# Patient Record
Sex: MALE | Race: WHITE | NOT HISPANIC OR LATINO | Employment: FULL TIME | URBAN - METROPOLITAN AREA
[De-identification: names, ages, dates, MRNs, and addresses within clinical notes are randomized per-mention and may not be internally consistent; named-entity substitution may affect disease eponyms.]

---

## 2021-06-02 ENCOUNTER — RECORDS - HEALTHEAST (OUTPATIENT)
Dept: ADMINISTRATIVE | Facility: CLINIC | Age: 61
End: 2021-06-02

## 2021-12-09 ENCOUNTER — TRANSFERRED RECORDS (OUTPATIENT)
Dept: HEALTH INFORMATION MANAGEMENT | Facility: CLINIC | Age: 61
End: 2021-12-09

## 2021-12-09 ENCOUNTER — LAB REQUISITION (OUTPATIENT)
Dept: LAB | Facility: CLINIC | Age: 61
End: 2021-12-09

## 2021-12-09 DIAGNOSIS — E78.2 MIXED HYPERLIPIDEMIA: ICD-10-CM

## 2021-12-09 LAB
CHOLEST SERPL-MCNC: 136 MG/DL
FASTING STATUS PATIENT QL REPORTED: ABNORMAL
HDLC SERPL-MCNC: 37 MG/DL
LDLC SERPL CALC-MCNC: 80 MG/DL
TRIGL SERPL-MCNC: 97 MG/DL

## 2021-12-09 PROCEDURE — 80061 LIPID PANEL: CPT | Performed by: FAMILY MEDICINE

## 2021-12-12 ENCOUNTER — HEALTH MAINTENANCE LETTER (OUTPATIENT)
Age: 61
End: 2021-12-12

## 2022-02-17 ENCOUNTER — OFFICE VISIT (OUTPATIENT)
Dept: CARDIOLOGY | Facility: CLINIC | Age: 62
End: 2022-02-17
Payer: COMMERCIAL

## 2022-02-17 VITALS
WEIGHT: 235 LBS | SYSTOLIC BLOOD PRESSURE: 134 MMHG | HEART RATE: 62 BPM | RESPIRATION RATE: 18 BRPM | OXYGEN SATURATION: 99 % | DIASTOLIC BLOOD PRESSURE: 72 MMHG

## 2022-02-17 DIAGNOSIS — I25.10 CORONARY ARTERY DISEASE INVOLVING NATIVE CORONARY ARTERY OF NATIVE HEART WITHOUT ANGINA PECTORIS: Primary | ICD-10-CM

## 2022-02-17 PROCEDURE — 99204 OFFICE O/P NEW MOD 45 MIN: CPT | Performed by: INTERNAL MEDICINE

## 2022-02-17 RX ORDER — ATORVASTATIN CALCIUM 80 MG/1
80 TABLET, FILM COATED ORAL DAILY
Qty: 90 TABLET | Refills: 3 | Status: SHIPPED | OUTPATIENT
Start: 2022-02-17 | End: 2022-12-13

## 2022-02-17 RX ORDER — NITROGLYCERIN 0.4 MG/1
0.4 TABLET SUBLINGUAL EVERY 5 MIN PRN
Qty: 20 TABLET | Refills: 4 | Status: SHIPPED | OUTPATIENT
Start: 2022-02-17

## 2022-02-17 RX ORDER — NITROGLYCERIN 0.4 MG/1
0.4 TABLET SUBLINGUAL PRN
COMMUNITY
Start: 2020-07-08 | End: 2022-02-17

## 2022-02-17 RX ORDER — ATORVASTATIN CALCIUM 40 MG/1
40 TABLET, FILM COATED ORAL DAILY
COMMUNITY
Start: 2021-11-09 | End: 2022-02-17

## 2022-02-17 NOTE — LETTER
2/17/2022    Omega Morales MD  [place] [address]  [place] [address]    RE: Tunde Orozco       Dear Colleague,     I had the pleasure of seeing Tunde Orozco in the [facility].      [facility]  [phone]          Assessment/Recommendations   Patient with known coronary artery disease, with percutaneous intervention which was staged in 2019.  He had stents in his LAD and right coronary artery.  He has been feeling well with no recurrence of the chest pressure discomfort that he had prior to his stent placement.  He is not exercising on a regular basis and I have encouraged him to briskly walk or do other types of aerobic exercise for a minimum of 30 minutes and at least 5 times each week for a total of 150 minutes.  He will take that under advisement.  I commended him for taking the stairs to being active by walking to work.    His LDL cholesterol is recently measured at 80 and I would prefer it be under 70.  To that end we have increase his our atorvastatin to 80 mg a day we will send in a lab to be checked in 3 months but also can be checked at his primary care office as well.    He is on an antiplatelet agent, and his blood pressure is at goal.    We will see him back in 1 year, but of course would be happy to see him sooner if questions or problems arise.       History of Present Illness/Subjective    Mr. Tunde Orozco is a 61 year old male with known coronary artery disease, status post staged percutaneous interventions in 2019.  He had what sounded like unstable angina and had percutaneous intervention of the LAD followed a few weeks later by intervention in the right coronary artery.  He has never had return of the pressure sensation in his chest.  Once in a while when he is stressed out at work he will feel slight discomfort in his chest but it is different than the discomfort he had prior to his stents and is not brought on with  physical activity.  He does walk to work which is 2 blocks and climb stairs at work often.  He goes up 4 flights of stairs and is pumping at the top but does not get any chest discomfort.  He denies orthopnea, paroxysmal nocturnal dyspnea, peripheral edema, syncope or near syncopal episodes.  He has no history of rheumatic fever, cerebrovascular accident or TIA.    He has never been treated for hypertension, has never smoked cigarettes, and is not diabetic.  His brother  of a myocardial infarction at age 56 and his father had an abdominal aneurysm.  The patient's recent LDL cholesterol on 40 mg of our atorvastatin was measured at 80.         Physical Examination Review of Systems   /72 (BP Location: Left arm, Patient Position: Sitting, Cuff Size: Adult Large)   Pulse 62   Resp 18   Wt 106.6 kg (235 lb)   SpO2 99%   There is no height or weight on file to calculate BMI.  Wt Readings from Last 3 Encounters:   22 106.6 kg (235 lb)     General Appearance:   Alert, cooperative and in no acute distress.   ENT/Mouth: Patient wearing a mask.      EYES:  no scleral icterus, normal conjunctivae   Neck: JVP normal. No Hepatojugular reflux. Thyroid not visualized.   Chest/Lungs:   Lungs are clear to auscultation, equal chest wall expansion.   Cardiovascular:   S1, S2 without murmur ,clicks or rubs. Brachial, radial and posterior tibial pulses are intact and symetric. No carotid bruits noted   Abdomen:  Nontender. BS+.    Extremities: No cyanosis, clubbing or edema   Skin: no xanthelasma, warm.    Neurologic: normal arm movement bilateral, no tremors     Psychiatric: Appropriate affect.      Enc Vitals  BP: 134/72  Pulse: 62  Resp: 18  SpO2: 99 %  Weight: 106.6 kg (235 lb)                                           Medical History  Surgical History Family History Social History   No past medical history on file. No past surgical history on file. No family history on file. Social History     Socioeconomic  History     Marital status:      Spouse name: Not on file     Number of children: Not on file     Years of education: Not on file     Highest education level: Not on file   Occupational History     Not on file   Tobacco Use     Smoking status: Not on file     Smokeless tobacco: Not on file   Substance and Sexual Activity     Alcohol use: Not on file     Drug use: Not on file     Sexual activity: Not on file   Other Topics Concern     Not on file   Social History Narrative     Not on file     Social Determinants of Health     Financial Resource Strain: Not on file   Food Insecurity: Not on file   Transportation Needs: Not on file   Physical Activity: Not on file   Stress: Not on file   Social Connections: Not on file   Intimate Partner Violence: Not on file   Housing Stability: Not on file          Medications  Allergies   Current Outpatient Medications   Medication Sig Dispense Refill     aspirin (ASA) 81 MG EC tablet Take 81 mg by mouth daily       atorvastatin (LIPITOR) 80 MG tablet Take 1 tablet (80 mg) by mouth daily 90 tablet 3     nitroGLYcerin (NITROSTAT) 0.4 MG sublingual tablet Place 1 tablet (0.4 mg) under the tongue every 5 minutes as needed for chest pain 20 tablet 4    Allergies   Allergen Reactions     Penicillins Anaphylaxis and Hives     Other reaction(s): hives         Lab Results    Chemistry/lipid CBC Cardiac Enzymes/BNP/TSH/INR   Lab Results   Component Value Date    CHOL 136 12/09/2021    HDL 37 (L) 12/09/2021    TRIG 97 12/09/2021    No results found for: WBC, HGB, HCT, MCV, PLT No results found for: CKTOTAL, CKMB, TROPONINI, BNP, TSH, INR

## 2022-10-02 ENCOUNTER — HEALTH MAINTENANCE LETTER (OUTPATIENT)
Age: 62
End: 2022-10-02

## 2022-10-07 ENCOUNTER — TRANSFERRED RECORDS (OUTPATIENT)
Dept: HEALTH INFORMATION MANAGEMENT | Facility: CLINIC | Age: 62
End: 2022-10-07

## 2022-10-08 ENCOUNTER — LAB REQUISITION (OUTPATIENT)
Dept: LAB | Facility: CLINIC | Age: 62
End: 2022-10-08

## 2022-10-08 ENCOUNTER — TRANSFERRED RECORDS (OUTPATIENT)
Dept: HEALTH INFORMATION MANAGEMENT | Facility: CLINIC | Age: 62
End: 2022-10-08

## 2022-10-08 DIAGNOSIS — U07.1 COVID-19: ICD-10-CM

## 2022-10-08 LAB
ANION GAP SERPL CALCULATED.3IONS-SCNC: 9 MMOL/L (ref 7–15)
BUN SERPL-MCNC: 15.9 MG/DL (ref 8–23)
CALCIUM SERPL-MCNC: 9.3 MG/DL (ref 8.8–10.2)
CHLORIDE SERPL-SCNC: 105 MMOL/L (ref 98–107)
CREAT SERPL-MCNC: 1.09 MG/DL (ref 0.67–1.17)
DEPRECATED HCO3 PLAS-SCNC: 27 MMOL/L (ref 22–29)
GFR SERPL CREATININE-BSD FRML MDRD: 77 ML/MIN/1.73M2
GLUCOSE SERPL-MCNC: 116 MG/DL (ref 70–99)
POTASSIUM SERPL-SCNC: 4.6 MMOL/L (ref 3.4–5.3)
SODIUM SERPL-SCNC: 141 MMOL/L (ref 136–145)

## 2022-10-08 PROCEDURE — 80048 BASIC METABOLIC PNL TOTAL CA: CPT | Performed by: PHYSICIAN ASSISTANT

## 2023-02-11 ENCOUNTER — HEALTH MAINTENANCE LETTER (OUTPATIENT)
Age: 63
End: 2023-02-11

## 2023-02-28 ENCOUNTER — TELEPHONE (OUTPATIENT)
Dept: CARDIOLOGY | Facility: CLINIC | Age: 63
End: 2023-02-28
Payer: COMMERCIAL

## 2023-02-28 DIAGNOSIS — I25.10 CORONARY ARTERY DISEASE INVOLVING NATIVE CORONARY ARTERY OF NATIVE HEART WITHOUT ANGINA PECTORIS: Primary | ICD-10-CM

## 2023-02-28 RX ORDER — ATORVASTATIN CALCIUM 80 MG/1
80 TABLET, FILM COATED ORAL DAILY
Qty: 90 TABLET | Refills: 1 | Status: SHIPPED | OUTPATIENT
Start: 2023-02-28 | End: 2023-03-23

## 2023-02-28 NOTE — TELEPHONE ENCOUNTER
Refill sent. Patient overdue for check of fasting lipids. Order placed per [facility]. Patient will try to schedule at the [facility]. -ejb    [facility] Call Center    Phone Message    May a detailed message be left on voicemail: yes     Reason for Call: Medication Refill Request    Has the patient contacted the pharmacy for the refill? Yes   Name of medication being requested: atorvastatin (LIPITOR) 80 MG tablet  Provider who prescribed the medication: Cornel turk H, MD  Pharmacy: [place], [place] - [address]  Date medication is needed: 03/03/23       Action Taken: Other: cardio    Travel Screening: Not Applicable     Thank you!  [facility]

## 2023-03-16 ENCOUNTER — LAB (OUTPATIENT)
Dept: LAB | Facility: CLINIC | Age: 63
End: 2023-03-16
Payer: COMMERCIAL

## 2023-03-16 DIAGNOSIS — I25.10 CORONARY ARTERY DISEASE INVOLVING NATIVE CORONARY ARTERY OF NATIVE HEART WITHOUT ANGINA PECTORIS: ICD-10-CM

## 2023-03-16 LAB
CHOLEST SERPL-MCNC: 124 MG/DL
HDLC SERPL-MCNC: 32 MG/DL
LDLC SERPL CALC-MCNC: 75 MG/DL
NONHDLC SERPL-MCNC: 92 MG/DL
TRIGL SERPL-MCNC: 87 MG/DL

## 2023-03-16 PROCEDURE — 36415 COLL VENOUS BLD VENIPUNCTURE: CPT

## 2023-03-16 PROCEDURE — 80061 LIPID PANEL: CPT

## 2023-03-23 ENCOUNTER — OFFICE VISIT (OUTPATIENT)
Dept: CARDIOLOGY | Facility: CLINIC | Age: 63
End: 2023-03-23
Payer: COMMERCIAL

## 2023-03-23 VITALS
HEART RATE: 76 BPM | DIASTOLIC BLOOD PRESSURE: 58 MMHG | RESPIRATION RATE: 16 BRPM | SYSTOLIC BLOOD PRESSURE: 102 MMHG | BODY MASS INDEX: 33.41 KG/M2 | WEIGHT: 233.4 LBS | HEIGHT: 70 IN

## 2023-03-23 DIAGNOSIS — I25.10 CORONARY ARTERY DISEASE INVOLVING NATIVE CORONARY ARTERY OF NATIVE HEART WITHOUT ANGINA PECTORIS: ICD-10-CM

## 2023-03-23 PROCEDURE — 99213 OFFICE O/P EST LOW 20 MIN: CPT | Performed by: INTERNAL MEDICINE

## 2023-03-23 RX ORDER — ATORVASTATIN CALCIUM 80 MG/1
80 TABLET, FILM COATED ORAL DAILY
Qty: 90 TABLET | Refills: 3 | Status: SHIPPED | OUTPATIENT
Start: 2023-03-23 | End: 2023-06-27

## 2023-03-23 NOTE — PROGRESS NOTES
[facility]  [phone]        Assessment/Recommendations   Patient with known coronary artery disease with previous history of stage percutaneous intervention.  He has been feeling well this past year.  He will get some fleeting discomforts in his upper chest when he is emotionally stressed at times.  A few seconds and are gone.  He does not get any chest discomfort or unusual shortness of breath with physical activity but he has not been as active as we would like.    His LDL cholesterol was 75 on a maximal dose of atorvastatin he takes an antiplatelet agent and his blood pressure is at goal.    I have encouraged him to work on his new stationary bike or walk, or do other physical activity for a minimum of 30 minutes and a minimum of 5 times each week.  I think you will get after that given that they just bought a new stationary bike.    I have not change in his medications today.  We will see him back in 1 year, but of course would be happy to see him sooner if questions or problems arise.    Thank you for allowing us to participate in his care.       History of Present Illness/Subjective    Mr. Tunde Orozco is a 62 year old male with known coronary artery disease with stage percutaneous interventions in 2019.  He had percutaneous intervention to the LAD followed by an intervention of the right coronary artery.  He has not had a return of the chest pressure that he had prior to his stents.  He does get a little discomfort in his upper chest if he is emotionally stressed but this lasts only a few seconds and then it is gone.  No unusual shortness of breath with activity although has not been as active as he would like this past year.  He denies orthopnea, paroxysmal nocturnal dyspnea, peripheral edema, syncope or near syncopal episodes.         Physical Examination Review of Systems   /58 (BP Location: Left arm, Patient Position: Sitting, Cuff Size: Adult Large)   Pulse 76    "Resp 16   Ht 1.778 m (5' 10\")   Wt 105.9 kg (233 lb 6.4 oz)   BMI 33.49 kg/m    Body mass index is 33.49 kg/m .  Wt Readings from Last 3 Encounters:   03/23/23 105.9 kg (233 lb 6.4 oz)   02/17/22 106.6 kg (235 lb)     General Appearance:   Alert, cooperative and in no acute distress.   ENT/Mouth: Patient wearing a mask.      EYES:  no scleral icterus, normal conjunctivae   Neck: JVP normal. No Hepatojugular reflux. Thyroid not visualized.   Chest/Lungs:   Lungs are clear to auscultation, equal chest wall expansion.   Cardiovascular:   S1, S2 without murmur ,clicks or rubs. Brachial, radial and posterior tibial pulses are intact and symetric. No carotid bruits noted   Abdomen:  Nontender.  Rounded   Extremities: No cyanosis, clubbing or edema   Skin: no xanthelasma, warm.    Neurologic: normal arm movement bilateral, no tremors     Psychiatric: Appropriate affect.      Enc Vitals  BP: 102/58  Pulse: 76  Resp: 16  Weight: 105.9 kg (233 lb 6.4 oz)  Height: 177.8 cm (5' 10\")                                           Medical History  Surgical History Family History Social History   No past medical history on file. No past surgical history on file. No family history on file. Social History     Socioeconomic History     Marital status:      Spouse name: Not on file     Number of children: Not on file     Years of education: Not on file     Highest education level: Not on file   Occupational History     Not on file   Tobacco Use     Smoking status: Never     Smokeless tobacco: Never   Vaping Use     Vaping Use: Never used   Substance and Sexual Activity     Alcohol use: Not on file     Drug use: Never     Sexual activity: Not on file   Other Topics Concern     Not on file   Social History Narrative     Not on file     Social Determinants of Health     Financial Resource Strain: Not on file   Food Insecurity: Not on file   Transportation Needs: Not on file   Physical Activity: Not on file   Stress: Not on file "   Social Connections: Not on file   Intimate Partner Violence: Not on file   Housing Stability: Not on file          Medications  Allergies   Current Outpatient Medications   Medication Sig Dispense Refill     aspirin (ASA) 81 MG EC tablet Take 81 mg by mouth daily       atorvastatin (LIPITOR) 80 MG tablet Take 1 tablet (80 mg) by mouth daily 90 tablet 3     nitroGLYcerin (NITROSTAT) 0.4 MG sublingual tablet Place 1 tablet (0.4 mg) under the tongue every 5 minutes as needed for chest pain 20 tablet 4    Allergies   Allergen Reactions     Penicillins Anaphylaxis and Hives     Other reaction(s): hives         Lab Results    Chemistry/lipid CBC Cardiac Enzymes/BNP/TSH/INR   Lab Results   Component Value Date    CHOL 124 03/16/2023    HDL 32 (L) 03/16/2023    TRIG 87 03/16/2023    BUN 15.9 10/08/2022     10/08/2022    CO2 27 10/08/2022    No results found for: WBC, HGB, HCT, MCV, PLT No results found for: CKTOTAL, CKMB, TROPONINI, BNP, TSH, INR

## 2023-03-23 NOTE — LETTER
3/23/2023    Omega Morales MD  [place] [address]  [place] [address]    RE: Tunde Orozco       Dear Colleague,     I had the pleasure of seeing Tunde Orozco in the [facility].      [facility]  [phone]        Assessment/Recommendations   Patient with known coronary artery disease with previous history of stage percutaneous intervention.  He has been feeling well this past year.  He will get some fleeting discomforts in his upper chest when he is emotionally stressed at times.  A few seconds and are gone.  He does not get any chest discomfort or unusual shortness of breath with physical activity but he has not been as active as we would like.    His LDL cholesterol was 75 on a maximal dose of atorvastatin he takes an antiplatelet agent and his blood pressure is at goal.    I have encouraged him to work on his new stationary bike or walk, or do other physical activity for a minimum of 30 minutes and a minimum of 5 times each week.  I think you will get after that given that they just bought a new stationary bike.    I have not change in his medications today.  We will see him back in 1 year, but of course would be happy to see him sooner if questions or problems arise.    Thank you for allowing us to participate in his care.       History of Present Illness/Subjective    Mr. Tunde Orozco is a 62 year old male with known coronary artery disease with stage percutaneous interventions in 2019.  He had percutaneous intervention to the LAD followed by an intervention of the right coronary artery.  He has not had a return of the chest pressure that he had prior to his stents.  He does get a little discomfort in his upper chest if he is emotionally stressed but this lasts only a few seconds and then it is gone.  No unusual shortness of breath with activity although has not been as active as he would like this past year.  He denies orthopnea,  "paroxysmal nocturnal dyspnea, peripheral edema, syncope or near syncopal episodes.         Physical Examination Review of Systems   /58 (BP Location: Left arm, Patient Position: Sitting, Cuff Size: Adult Large)   Pulse 76   Resp 16   Ht 1.778 m (5' 10\")   Wt 105.9 kg (233 lb 6.4 oz)   BMI 33.49 kg/m    Body mass index is 33.49 kg/m .  Wt Readings from Last 3 Encounters:   03/23/23 105.9 kg (233 lb 6.4 oz)   02/17/22 106.6 kg (235 lb)     General Appearance:   Alert, cooperative and in no acute distress.   ENT/Mouth: Patient wearing a mask.      EYES:  no scleral icterus, normal conjunctivae   Neck: JVP normal. No Hepatojugular reflux. Thyroid not visualized.   Chest/Lungs:   Lungs are clear to auscultation, equal chest wall expansion.   Cardiovascular:   S1, S2 without murmur ,clicks or rubs. Brachial, radial and posterior tibial pulses are intact and symetric. No carotid bruits noted   Abdomen:  Nontender.  Rounded   Extremities: No cyanosis, clubbing or edema   Skin: no xanthelasma, warm.    Neurologic: normal arm movement bilateral, no tremors     Psychiatric: Appropriate affect.      Enc Vitals  BP: 102/58  Pulse: 76  Resp: 16  Weight: 105.9 kg (233 lb 6.4 oz)  Height: 177.8 cm (5' 10\")                                           Medical History  Surgical History Family History Social History   No past medical history on file. No past surgical history on file. No family history on file. Social History     Socioeconomic History     Marital status:      Spouse name: Not on file     Number of children: Not on file     Years of education: Not on file     Highest education level: Not on file   Occupational History     Not on file   Tobacco Use     Smoking status: Never     Smokeless tobacco: Never   Vaping Use     Vaping Use: Never used   Substance and Sexual Activity     Alcohol use: Not on file     Drug use: Never     Sexual activity: Not on file   Other Topics Concern     Not on file   Social " History Narrative     Not on file     Social Determinants of Health     Financial Resource Strain: Not on file   Food Insecurity: Not on file   Transportation Needs: Not on file   Physical Activity: Not on file   Stress: Not on file   Social Connections: Not on file   Intimate Partner Violence: Not on file   Housing Stability: Not on file          Medications  Allergies   Current Outpatient Medications   Medication Sig Dispense Refill     aspirin (ASA) 81 MG EC tablet Take 81 mg by mouth daily       atorvastatin (LIPITOR) 80 MG tablet Take 1 tablet (80 mg) by mouth daily 90 tablet 3     nitroGLYcerin (NITROSTAT) 0.4 MG sublingual tablet Place 1 tablet (0.4 mg) under the tongue every 5 minutes as needed for chest pain 20 tablet 4    Allergies   Allergen Reactions     Penicillins Anaphylaxis and Hives     Other reaction(s): hives         Lab Results    Chemistry/lipid CBC Cardiac Enzymes/BNP/TSH/INR   Lab Results   Component Value Date    CHOL 124 03/16/2023    HDL 32 (L) 03/16/2023    TRIG 87 03/16/2023    BUN 15.9 10/08/2022     10/08/2022    CO2 27 10/08/2022    No results found for: WBC, HGB, HCT, MCV, PLT No results found for: CKTOTAL, CKMB, TROPONINI, BNP, TSH, INR             Thank you for allowing me to participate in the care of your patient.      Sincerely,     Cornel Siddiqui MD     [facility]  cc:   Cornel Siddiqui MD  [address]  [place] [address]

## 2023-06-27 DIAGNOSIS — I25.10 CORONARY ARTERY DISEASE INVOLVING NATIVE CORONARY ARTERY OF NATIVE HEART WITHOUT ANGINA PECTORIS: ICD-10-CM

## 2023-06-27 RX ORDER — ATORVASTATIN CALCIUM 80 MG/1
80 TABLET, FILM COATED ORAL DAILY
Qty: 90 TABLET | Refills: 3 | Status: SHIPPED | OUTPATIENT
Start: 2023-06-27 | End: 2024-03-19

## 2024-03-09 ENCOUNTER — HEALTH MAINTENANCE LETTER (OUTPATIENT)
Age: 64
End: 2024-03-09

## 2024-03-18 DIAGNOSIS — I25.10 CORONARY ARTERY DISEASE INVOLVING NATIVE CORONARY ARTERY OF NATIVE HEART WITHOUT ANGINA PECTORIS: ICD-10-CM

## 2024-03-19 RX ORDER — ATORVASTATIN CALCIUM 80 MG/1
80 TABLET, FILM COATED ORAL DAILY
Qty: 90 TABLET | Refills: 0 | Status: SHIPPED | OUTPATIENT
Start: 2024-03-19 | End: 2024-04-03

## 2024-04-03 ENCOUNTER — OFFICE VISIT (OUTPATIENT)
Dept: CARDIOLOGY | Facility: CLINIC | Age: 64
End: 2024-04-03
Payer: COMMERCIAL

## 2024-04-03 VITALS
BODY MASS INDEX: 32.14 KG/M2 | RESPIRATION RATE: 14 BRPM | HEART RATE: 61 BPM | WEIGHT: 224 LBS | DIASTOLIC BLOOD PRESSURE: 60 MMHG | SYSTOLIC BLOOD PRESSURE: 110 MMHG

## 2024-04-03 DIAGNOSIS — I25.10 CORONARY ARTERY DISEASE INVOLVING NATIVE CORONARY ARTERY OF NATIVE HEART WITHOUT ANGINA PECTORIS: Primary | ICD-10-CM

## 2024-04-03 DIAGNOSIS — E78.5 DYSLIPIDEMIA, GOAL LDL BELOW 70: ICD-10-CM

## 2024-04-03 PROCEDURE — 99214 OFFICE O/P EST MOD 30 MIN: CPT

## 2024-04-03 RX ORDER — ATORVASTATIN CALCIUM 80 MG/1
80 TABLET, FILM COATED ORAL DAILY
Qty: 180 TABLET | Refills: 1 | Status: SHIPPED | OUTPATIENT
Start: 2024-04-03

## 2024-04-03 NOTE — LETTER
4/3/2024    [place]  [address] [address]    RE: Tunde Orozco       Dear Colleague,     I had the pleasure of seeing Tunde Orozco in the [facility].    HEART CARE ENCOUNTER CONSULTATON NOTE      [facility]  [phone]      Assessment/Recommendations   Assessment:   CAD: DESx 2 LAD/D1 and NIDIA x2 RCA 2019 [facility], denies angina on aspirin 81 mg  HLD: atorvastatin 80 mg.  Last LDL 75 ago, we discussed recommendation for addition of Zetia 10 mg if LDL has continued to rise, mechanism of action and potential side effect.    Plan:   Repeat fasting lipid panel, prefers [facility].  Continue daily exercise of walking and healthy diet      Follow up in 1 year or sooner as needed     History of Present Illness/Subjective    HPI: Tunde Orozco is a 63 year old male with PMHx of CAD, HLD presents for follow up. Patient reports feeling well with no changes over the last year.  Continues atorvastatin and aspirin daily.  He walks daily for exercise.  He denies fatigue, shortness of breath, dyspnea on exertion, orthopnea, PND, palpitations, chest pain, and lower extremity edema.        Angiogram 2019:   Conclusions        1) Significant mid RCA stenosis, severe disease distal         RCA.        2) Patent stents proximal to mid LAD/D1.         3) Normal LVEDP=10mmHg.         4) S/p uneventful PCI of to mid RCA (4.0x12mm Synergy         NIDIA), distal RCA into ISABELLA (2.70y25fb Synergy NIDIA) +         POBA ostial PDA.      Conclusions        1) Subtotal occlusion of mid LAD after D1 take-off         (culprit-ZANE II flow).        2) Borderline mid RCA stenosis, severe disease distal         RCA        3) LVEDP=10mmHg.        4) S/p uneventful PCI of proximal to mid LAD (Synergy         DESx3) and D1 (Synergy DESx1).      Physical Examination  Review of Systems   Vitals: /60 (BP Location: Right arm, Patient Position: Sitting,  Cuff Size: Adult Large)   Pulse 61   Resp 14   Wt 101.6 kg (224 lb)   BMI 32.14 kg/m    BMI= Body mass index is 32.14 kg/m .  Wt Readings from Last 3 Encounters:   04/03/24 101.6 kg (224 lb)   03/23/23 105.9 kg (233 lb 6.4 oz)   02/17/22 106.6 kg (235 lb)           ENT/Mouth: membranes moist, no oral lesions or bleeding gums.      EYES:  no scleral icterus, normal conjunctivae                    Neck: No carotid bruit or thyromegaly   Chest/Lungs:   lungs are clear to auscultation, no rales or wheezing, equal chest wall expansion    Cardiovascular:   Regular. Normal first and second heart sounds with no murmurs, rubs, or gallops; the carotid, radial and posterior tibial pulses are intact, absent edema bilaterally        Extremities: no cyanosis or clubbing   Skin: no xanthelasma, warm.    Neurologic:  no tremors     Psychiatric: alert and oriented x3, calm        Please refer above for cardiac ROS details.        Medical History  Surgical History Family History Social History   No past medical history on file.  No past surgical history on file.  No family history on file.     Social History     Socioeconomic History    Marital status:      Spouse name: Not on file    Number of children: Not on file    Years of education: Not on file    Highest education level: Not on file   Occupational History    Not on file   Tobacco Use    Smoking status: Never    Smokeless tobacco: Never   Vaping Use    Vaping Use: Never used   Substance and Sexual Activity    Alcohol use: Not on file    Drug use: Never    Sexual activity: Not on file   Other Topics Concern    Not on file   Social History Narrative    Not on file     Social Determinants of Health     Financial Resource Strain: Not on file   Food Insecurity: Not on file   Transportation Needs: Not on file   Physical Activity: Not on file   Stress: Not on file   Social Connections: Not on file   Interpersonal Safety: Not on file   Housing Stability: Not on file      "      Medications  Allergies   Current Outpatient Medications   Medication Sig Dispense Refill    aspirin (ASA) 81 MG EC tablet Take 81 mg by mouth daily      atorvastatin (LIPITOR) 80 MG tablet Take 1 tablet (80 mg) by mouth daily **Due for follow-up appointment** 180 tablet 1    nitroGLYcerin (NITROSTAT) 0.4 MG sublingual tablet Place 1 tablet (0.4 mg) under the tongue every 5 minutes as needed for chest pain 20 tablet 4       Allergies   Allergen Reactions    Penicillins Anaphylaxis and Hives     Other reaction(s): hives          Lab Results    Chemistry/lipid CBC Cardiac Enzymes/BNP/TSH/INR   Recent Labs   Lab Test 03/16/23  1000   CHOL 124   HDL 32*   LDL 75   TRIG 87     Recent Labs   Lab Test 03/16/23  1000 12/09/21  1000 10/11/16  1636   LDL 75 80 131*     Recent Labs   Lab Test 10/08/22  1040      POTASSIUM 4.6   CHLORIDE 105   CO2 27   *   BUN 15.9   CR 1.09   GFRESTIMATED 77   SHELLIE 9.3     Recent Labs   Lab Test 10/08/22  1040   CR 1.09     No results for input(s): \"A1C\" in the last 59330 hours.       No results for input(s): \"WBC\", \"HGB\", \"HCT\", \"MCV\", \"PLT\" in the last 78343 hours.  No results for input(s): \"HGB\" in the last 91596 hours. No results for input(s): \"TROPONINI\" in the last 05363 hours.  No results for input(s): \"BNP\", \"NTBNPI\", \"NTBNP\" in the last 86716 hours.  No results for input(s): \"TSH\" in the last 76061 hours.  No results for input(s): \"INR\" in the last 64502 hours.       This note has been dictated using voice recognition software. Any grammatical, typographical, or context distortions are unintentional and inherent to the software    Cathy Torres PA-C      Thank you for allowing me to participate in the care of your patient.      Sincerely,     Cathy Oliver PA-C     [facility]  cc:   Cornel Siddiqui MD  [address]  [place] [address]      "

## 2024-04-03 NOTE — PATIENT INSTRUCTIONS
It was a pleasure taking part in your care today:    - Call [facility] to complete fasting cholesterol panel. With further elevation would recommend adding Zetia (ezetimibe)  - Continue current medications at this time    Please call the [facility] clinic with any questions or concerns at [phone].     Cathy Oliver PA-C

## 2024-09-17 ENCOUNTER — LAB REQUISITION (OUTPATIENT)
Dept: LAB | Facility: CLINIC | Age: 64
End: 2024-09-17

## 2024-09-17 DIAGNOSIS — Z12.5 ENCOUNTER FOR SCREENING FOR MALIGNANT NEOPLASM OF PROSTATE: ICD-10-CM

## 2024-09-17 DIAGNOSIS — E78.5 HYPERLIPIDEMIA, UNSPECIFIED: ICD-10-CM

## 2024-09-17 DIAGNOSIS — Z13.0 ENCOUNTER FOR SCREENING FOR DISEASES OF THE BLOOD AND BLOOD-FORMING ORGANS AND CERTAIN DISORDERS INVOLVING THE IMMUNE MECHANISM: ICD-10-CM

## 2024-09-17 LAB
ALBUMIN SERPL BCG-MCNC: 4.1 G/DL (ref 3.5–5.2)
ALP SERPL-CCNC: 91 U/L (ref 40–150)
ALT SERPL W P-5'-P-CCNC: 27 U/L (ref 0–70)
ANION GAP SERPL CALCULATED.3IONS-SCNC: 12 MMOL/L (ref 7–15)
AST SERPL W P-5'-P-CCNC: 29 U/L (ref 0–45)
BILIRUB SERPL-MCNC: 0.9 MG/DL
BUN SERPL-MCNC: 18.9 MG/DL (ref 8–23)
CALCIUM SERPL-MCNC: 8.9 MG/DL (ref 8.8–10.4)
CHLORIDE SERPL-SCNC: 105 MMOL/L (ref 98–107)
CREAT SERPL-MCNC: 1.04 MG/DL (ref 0.67–1.17)
EGFRCR SERPLBLD CKD-EPI 2021: 81 ML/MIN/1.73M2
ERYTHROCYTE [DISTWIDTH] IN BLOOD BY AUTOMATED COUNT: 13.5 % (ref 10–15)
GLUCOSE SERPL-MCNC: 89 MG/DL (ref 70–99)
HCO3 SERPL-SCNC: 24 MMOL/L (ref 22–29)
HCT VFR BLD AUTO: 42.4 % (ref 40–53)
HGB BLD-MCNC: 14.5 G/DL (ref 13.3–17.7)
MCH RBC QN AUTO: 32.1 PG (ref 26.5–33)
MCHC RBC AUTO-ENTMCNC: 34.2 G/DL (ref 31.5–36.5)
MCV RBC AUTO: 94 FL (ref 78–100)
PLATELET # BLD AUTO: 231 10E3/UL (ref 150–450)
POTASSIUM SERPL-SCNC: 4 MMOL/L (ref 3.4–5.3)
PROT SERPL-MCNC: 6.5 G/DL (ref 6.4–8.3)
PSA SERPL DL<=0.01 NG/ML-MCNC: 1 NG/ML (ref 0–4.5)
RBC # BLD AUTO: 4.52 10E6/UL (ref 4.4–5.9)
SODIUM SERPL-SCNC: 141 MMOL/L (ref 135–145)
TSH SERPL DL<=0.005 MIU/L-ACNC: 1.32 UIU/ML (ref 0.3–4.2)
WBC # BLD AUTO: 6.8 10E3/UL (ref 4–11)

## 2024-09-17 PROCEDURE — 85027 COMPLETE CBC AUTOMATED: CPT | Performed by: FAMILY MEDICINE

## 2024-09-17 PROCEDURE — G0103 PSA SCREENING: HCPCS | Performed by: FAMILY MEDICINE

## 2024-09-17 PROCEDURE — 80053 COMPREHEN METABOLIC PANEL: CPT | Performed by: FAMILY MEDICINE

## 2024-09-17 PROCEDURE — 84443 ASSAY THYROID STIM HORMONE: CPT | Performed by: FAMILY MEDICINE
